# Patient Record
Sex: FEMALE
[De-identification: names, ages, dates, MRNs, and addresses within clinical notes are randomized per-mention and may not be internally consistent; named-entity substitution may affect disease eponyms.]

---

## 2019-04-08 ENCOUNTER — TRANSCRIPTION ENCOUNTER (OUTPATIENT)
Age: 49
End: 2019-04-08

## 2022-05-18 ENCOUNTER — APPOINTMENT (OUTPATIENT)
Dept: ORTHOPEDIC SURGERY | Facility: CLINIC | Age: 52
End: 2022-05-18
Payer: COMMERCIAL

## 2022-05-18 ENCOUNTER — RESULT CHARGE (OUTPATIENT)
Age: 52
End: 2022-05-18

## 2022-05-18 DIAGNOSIS — S30.0XXA CONTUSION OF LOWER BACK AND PELVIS, INITIAL ENCOUNTER: ICD-10-CM

## 2022-05-18 DIAGNOSIS — T14.8XXA OTHER INJURY OF UNSPECIFIED BODY REGION, INITIAL ENCOUNTER: ICD-10-CM

## 2022-05-18 PROBLEM — Z00.00 ENCOUNTER FOR PREVENTIVE HEALTH EXAMINATION: Status: ACTIVE | Noted: 2022-05-18

## 2022-05-18 PROCEDURE — 72100 X-RAY EXAM L-S SPINE 2/3 VWS: CPT

## 2022-05-18 PROCEDURE — 72170 X-RAY EXAM OF PELVIS: CPT

## 2022-05-18 PROCEDURE — 99203 OFFICE O/P NEW LOW 30 MIN: CPT

## 2022-05-18 NOTE — PHYSICAL EXAM
[] : diminished ROM in all planes [Flexion] : flexion [FreeTextEntry3] : bruising over right buttock region [FreeTextEntry8] : significant ttp over region of ecchymosis

## 2022-05-18 NOTE — HISTORY OF PRESENT ILLNESS
[de-identified] : Low back pain after fall directly onto her back hours earlier. Significant pain and brusing to her right buttock region. Denies radicular complaints. No N/T. No b/b dysfunction.\par PMH: none

## 2022-05-18 NOTE — ASSESSMENT
[FreeTextEntry1] : Xrays reviewed with patient\par treatment options discussed\par ice/heat as needed\par otc anti-inflammatories as needed\par follow up prn\par if hematoma continues to be painful, recommend pcp follow up